# Patient Record
Sex: MALE | Race: WHITE | NOT HISPANIC OR LATINO | URBAN - NONMETROPOLITAN AREA
[De-identification: names, ages, dates, MRNs, and addresses within clinical notes are randomized per-mention and may not be internally consistent; named-entity substitution may affect disease eponyms.]

---

## 2021-02-02 ENCOUNTER — IMPORTED ENCOUNTER (OUTPATIENT)
Dept: URBAN - NONMETROPOLITAN AREA CLINIC 1 | Facility: CLINIC | Age: 46
End: 2021-02-02

## 2021-02-02 PROBLEM — H52.13: Noted: 2021-02-02

## 2021-02-02 PROCEDURE — 92004 COMPRE OPH EXAM NEW PT 1/>: CPT

## 2021-02-02 PROCEDURE — 92310 CONTACT LENS FITTING OU: CPT

## 2021-02-02 PROCEDURE — 92015 DETERMINE REFRACTIVE STATE: CPT

## 2021-02-02 NOTE — PATIENT DISCUSSION
Myopia OUDiscussed refractive status in detail with patient. New glasses and CL Rx given today. Discussed proper lens care replacement and hygiene in detail. Continue to monitor.

## 2022-04-09 ASSESSMENT — VISUAL ACUITY
OD_SC: 20/25-
OS_SC: 20/20-

## 2022-04-09 ASSESSMENT — KERATOMETRY
OD_K1POWER_DIOPTERS: 43.25
OS_AXISANGLE_DEGREES: 005
OS_K1POWER_DIOPTERS: 43.25
OD_AXISANGLE_DEGREES: 006
OS_K2POWER_DIOPTERS: 43.75
OD_K2POWER_DIOPTERS: 43.75

## 2022-04-09 ASSESSMENT — TONOMETRY
OS_IOP_MMHG: 14
OD_IOP_MMHG: 14